# Patient Record
Sex: MALE | Race: OTHER | HISPANIC OR LATINO | Employment: FULL TIME | URBAN - METROPOLITAN AREA
[De-identification: names, ages, dates, MRNs, and addresses within clinical notes are randomized per-mention and may not be internally consistent; named-entity substitution may affect disease eponyms.]

---

## 2021-10-04 ENCOUNTER — OFFICE VISIT (OUTPATIENT)
Dept: ORTHOPEDICS | Facility: CLINIC | Age: 31
End: 2021-10-04
Payer: OTHER MISCELLANEOUS

## 2021-10-04 VITALS — WEIGHT: 275 LBS | BODY MASS INDEX: 35.29 KG/M2 | HEIGHT: 74 IN

## 2021-10-04 DIAGNOSIS — M25.511 ACUTE PAIN OF RIGHT SHOULDER: Primary | ICD-10-CM

## 2021-10-04 PROCEDURE — 99999 PR PBB SHADOW E&M-EST. PATIENT-LVL II: ICD-10-PCS | Mod: PBBFAC,,, | Performed by: ORTHOPAEDIC SURGERY

## 2021-10-04 PROCEDURE — 99203 OFFICE O/P NEW LOW 30 MIN: CPT | Mod: S$GLB,,, | Performed by: ORTHOPAEDIC SURGERY

## 2021-10-04 PROCEDURE — 99203 PR OFFICE/OUTPT VISIT, NEW, LEVL III, 30-44 MIN: ICD-10-PCS | Mod: S$GLB,,, | Performed by: ORTHOPAEDIC SURGERY

## 2021-10-04 PROCEDURE — 99999 PR PBB SHADOW E&M-EST. PATIENT-LVL II: CPT | Mod: PBBFAC,,, | Performed by: ORTHOPAEDIC SURGERY

## 2021-10-25 ENCOUNTER — OFFICE VISIT (OUTPATIENT)
Dept: ORTHOPEDICS | Facility: CLINIC | Age: 31
End: 2021-10-25
Payer: OTHER MISCELLANEOUS

## 2021-10-25 VITALS — HEIGHT: 74 IN | BODY MASS INDEX: 35.29 KG/M2 | WEIGHT: 275 LBS

## 2021-10-25 DIAGNOSIS — M25.511 ACUTE PAIN OF RIGHT SHOULDER: Primary | ICD-10-CM

## 2021-10-25 PROCEDURE — 99999 PR PBB SHADOW E&M-EST. PATIENT-LVL II: ICD-10-PCS | Mod: PBBFAC,,, | Performed by: ORTHOPAEDIC SURGERY

## 2021-10-25 PROCEDURE — 99213 PR OFFICE/OUTPT VISIT, EST, LEVL III, 20-29 MIN: ICD-10-PCS | Mod: S$GLB,,, | Performed by: ORTHOPAEDIC SURGERY

## 2021-10-25 PROCEDURE — 99999 PR PBB SHADOW E&M-EST. PATIENT-LVL II: CPT | Mod: PBBFAC,,, | Performed by: ORTHOPAEDIC SURGERY

## 2021-10-25 PROCEDURE — 99213 OFFICE O/P EST LOW 20 MIN: CPT | Mod: S$GLB,,, | Performed by: ORTHOPAEDIC SURGERY

## 2022-01-20 ENCOUNTER — OFFICE VISIT (OUTPATIENT)
Dept: URGENT CARE | Facility: CLINIC | Age: 32
End: 2022-01-20

## 2022-01-20 VITALS
OXYGEN SATURATION: 98 % | WEIGHT: 290 LBS | HEART RATE: 99 BPM | BODY MASS INDEX: 37.22 KG/M2 | HEIGHT: 74 IN | SYSTOLIC BLOOD PRESSURE: 131 MMHG | RESPIRATION RATE: 18 BRPM | TEMPERATURE: 98 F | DIASTOLIC BLOOD PRESSURE: 84 MMHG

## 2022-01-20 DIAGNOSIS — I10 HYPERTENSION, UNSPECIFIED TYPE: ICD-10-CM

## 2022-01-20 DIAGNOSIS — U07.1 COVID-19 VIRUS DETECTED: ICD-10-CM

## 2022-01-20 DIAGNOSIS — R51.9 ACUTE NONINTRACTABLE HEADACHE, UNSPECIFIED HEADACHE TYPE: ICD-10-CM

## 2022-01-20 DIAGNOSIS — Z20.822 ENCOUNTER FOR LABORATORY TESTING FOR COVID-19 VIRUS: Primary | ICD-10-CM

## 2022-01-20 LAB
CTP QC/QA: YES
SARS-COV-2 RDRP RESP QL NAA+PROBE: POSITIVE

## 2022-01-20 PROCEDURE — U0002 COVID-19 LAB TEST NON-CDC: HCPCS | Mod: QW,S$GLB,, | Performed by: FAMILY MEDICINE

## 2022-01-20 PROCEDURE — 99213 OFFICE O/P EST LOW 20 MIN: CPT | Mod: S$GLB,,, | Performed by: FAMILY MEDICINE

## 2022-01-20 PROCEDURE — U0002: ICD-10-PCS | Mod: QW,S$GLB,, | Performed by: FAMILY MEDICINE

## 2022-01-20 PROCEDURE — 99213 PR OFFICE/OUTPT VISIT, EST, LEVL III, 20-29 MIN: ICD-10-PCS | Mod: S$GLB,,, | Performed by: FAMILY MEDICINE

## 2022-01-20 RX ORDER — IRBESARTAN 150 MG/1
150 TABLET ORAL NIGHTLY
Qty: 90 TABLET | Refills: 3 | Status: SHIPPED | OUTPATIENT
Start: 2022-01-20 | End: 2023-04-15 | Stop reason: SDUPTHER

## 2022-01-20 NOTE — PROGRESS NOTES
"Subjective:       Patient ID: Vijay Nagel is a 31 y.o. male.    Vitals:  height is 6' 2" (1.88 m) and weight is 131.5 kg (290 lb). His temperature is 97.5 °F (36.4 °C). His blood pressure is 131/84 and his pulse is 99. His respiration is 18 and oxygen saturation is 98%.     Chief Complaint: Headache    Patient stated his symptoms started a few months ago and his PCP send in medication for him for his head but his blood pressure is high every day. Pt is not taking BP medication daily       He does not remember the name of the medication his PCP prescribed for him.     Headache   This is a new problem. The current episode started more than 1 month ago. The problem has been unchanged. The pain does not radiate. The pain quality is similar to prior headaches. The pain is at a severity of 0/10. The patient is experiencing no pain. Associated symptoms include coughing and dizziness. Pertinent negatives include no fever. There is no history of cancer or hypertension.       Constitution: Negative for fever.   Respiratory: Positive for cough.    Neurological: Positive for dizziness and headaches.       Objective:      Physical Exam   Constitutional: He is oriented to person, place, and time. He appears well-developed and well-nourished. He is cooperative.  Non-toxic appearance. He does not appear ill. No distress.   HENT:   Head: Normocephalic and atraumatic.   Ears:   Right Ear: Hearing, tympanic membrane, external ear and ear canal normal.   Left Ear: Hearing, tympanic membrane, external ear and ear canal normal.   Nose: Nose normal. No mucosal edema, rhinorrhea or nasal deformity. No epistaxis. Right sinus exhibits no maxillary sinus tenderness and no frontal sinus tenderness. Left sinus exhibits no maxillary sinus tenderness and no frontal sinus tenderness.   Mouth/Throat: Uvula is midline, oropharynx is clear and moist and mucous membranes are normal. Mucous membranes are moist. No trismus in the jaw. Normal " dentition. No uvula swelling. No posterior oropharyngeal erythema. Oropharynx is clear.   Eyes: Conjunctivae and lids are normal. Pupils are equal, round, and reactive to light. Right eye exhibits no discharge. Left eye exhibits no discharge. No scleral icterus.      extraocular movement intact   Neck: Trachea normal and phonation normal. Neck supple.   Cardiovascular: Normal rate, regular rhythm, normal heart sounds, intact distal pulses and normal pulses.   Pulmonary/Chest: Effort normal and breath sounds normal. No respiratory distress.   Abdominal: Normal appearance and bowel sounds are normal. He exhibits no distension, no pulsatile midline mass and no mass. Soft. There is no abdominal tenderness.   Musculoskeletal: Normal range of motion.         General: No deformity or edema. Normal range of motion.   Neurological: He is alert and oriented to person, place, and time. He exhibits normal muscle tone. Coordination normal.   Skin: Skin is warm, dry, intact, not diaphoretic and not pale.   Psychiatric: He has a normal mood and affect. His speech is normal and behavior is normal. Judgment and thought content normal. Cognition and memory  Nursing note and vitals reviewed.        Assessment:       1. Acute nonintractable headache, unspecified headache type    2. Hypertension, unspecified type          Plan:         Acute nonintractable headache, unspecified headache type    Hypertension, unspecified type    Other orders  -     irbesartan (AVAPRO) 150 MG tablet; Take 1 tablet (150 mg total) by mouth every evening.  Dispense: 90 tablet; Refill: 3                Pt advised to take medication daily     Follow up with pcp    Pt advised symptomatic treatment     Quarantine for 5 days    Rest and fluids  Results for orders placed or performed in visit on 01/20/22   POCT COVID-19 Rapid Screening   Result Value Ref Range    POC Rapid COVID Positive (A) Negative     Acceptable Yes

## 2022-01-20 NOTE — LETTER
January 20, 2022      [facility]  [address] [place] [address]  Phone: [phone]  Fax: [phone]       Patient: Vijay Nagel   YOB: 1990  Date of Visit: 01/20/2022    To Whom It May Concern:    Camron Nagel  was at [facility] on 01/20/2022. The patient may return to work/school on 1/24/22   with no restrictions. If you have any questions or concerns, or if I can be of further assistance, please do not hesitate to contact me.    Sincerely,    Oly Baron MD

## 2022-01-20 NOTE — PATIENT INSTRUCTIONS
Please isolate yourself at home.  You may leave home and/or return to work once the following conditions are met:    If you were not hospitalized and are not moderately to severely immunocompromised:    More than 5 days since symptoms first appeared AND   More than 24 hours fever free without medications AND   Symptoms are improving   Continue to wear a mask around others for 5 additional days.    If you were hospitalized OR are moderately to severely immunocompromised:   More than 20 days since symptoms first appeared   More than 24 hours fever free without medications   Symptoms have improved    If you had no symptoms but tested positive:   More than 5 days since the date of the first positive test (20 days if moderately to severely immunocompromised). If you develop symptoms, then use the guidelines above.   Continue to wear a mask around others for 5 additional days.      Contact Tracing    As one of the next steps, you will receive a call or text from the [place] Department of Health ([facility]) COVID-19 Tracing Team. See the contact information below so you know not to ignore the health departments call. It is important that you contact them back immediately so they can help.      Contact Tracer Number:  [phone]  Caller ID for most carriers: [facility]     What is contact tracing?  · Contact tracing is a process that helps identify everyone who has been in close contact with an infected person. Contact tracers let those people know they may have been exposed and guide them on next steps. Confidentiality is important for everyone; no one will be told who may have exposed them to the virus.  · Your involvement is important. The more we know about where and how this virus is spreading, the better chance we have at stopping it from spreading further.  What does exposure mean?  · Exposure means you have been within 6 feet for more than 15 minutes with a person who has or had COVID-19.  What kind of  questions do the contact tracers ask?  · A contact tracer will confirm your basic contact information including name, address, phone number, and next of kin, as well as asking about any symptoms you may have had. Theyll also ask you how you think you may have gotten sick, such as places where you may have been exposed to the virus, and people you were with. Those names will never be shared with anyone outside of that call, and will only be used to help trace and stop the spread of the virus.   I have privacy concerns. How will the state use my information?  · Your privacy about your health is important. All calls are completed using call centers that use the appropriate health privacy protection measures (HIPAA compliance), meaning that your patient information is safe. No one will ever ask you any questions related to immigration status. Your health comes first.   Do I have to participate?  · You do not have to participate, but we strongly encourage you to. Contact tracing can help us catch and control new outbreaks as theyre developing to keep your friends and family safe.   What if I dont hear from anyone?  · If you dont receive a call within 24 hours, you can call the number above right away to inquire about your status. That line is open from 8:00 am - 8:00 p.m., 7 days a week.  Contact tracing saves lives! Together, we have the power to beat this virus and keep our loved ones and neighbors safe.    For more information see CDC link below.      https://www.cdc.gov/coronavirus/2019-ncov/hcp/guidance-prevent-spread.html#precautions        Sources:  [facility], [facility] and [facility] Education       Presión arterial lakeisha, servicio de emergencias   ¿Qué cuidados se necesitan en casa?   · Comuníquese con rivera médico de cabecera para comunicarle que estuvo en el servicio de emergencias. Programe dwayne reyes de seguimiento si así se lo pidieron. Es posible que deba selvin a rivera médico varias veces  antes de que puedan diagnosticar la hipertensión.  · [place] todos heather medicamentos según las indicaciones. No deje de vane ninguno de los medicamentos sin antes hablar con rivera médico.  · Aprenda a controlar rivera presión arterial en casa, si rivera médico le sugiere que lo david.  Call for Emergency Help   · Llame a dwayne ambulancia de inmediato si:   ? Tiene señales de un ataque cardíaco, que pueden incluir:  § Dolor de pecho intenso, presión o malestar con:  § Dificultad para respirar, sudoración, malestar estomacal o piel fría y húmeda.  § Dolor en los brazos, la espalda o la mandíbula.  § Dolor más intenso con actividades stephania subir escaleras.  § Latidos cardíacos rápidos o irregulares.  § Sensación de mareo, desmayos o debilidad.  ? Tiene signos de accidente cerebrovascular, stephania repentinos:  § Entumecimiento o debilidad de la heri, el brazo o la pierna, especialmente en un lado del cuerpo.  § Confusión, dificultad para hablar o comprender.  § Problemas para selvin con yemi o los dos ojos.  § Problemas para caminar, mareos, pérdida del equilibrio o la coordinación.  § Dolor de ovidio intenso sin causa conocida.  ? Tiene convulsiones o se desmaya.  ? Tiene un amy dolor de ovidio con malestar estomacal o vómitos.  ? Tiene dolor de espalda intenso repentino.  ¿Cuándo bernice llamar al médico?   · Dos lecturas de presión arterial mayores de 180/120.  · Rivera orina es marrón o con luly.  · Tiene síntomas nuevos o que empeoran.  Exención de responsabilidad y uso de la información del consumidor   Esta información no constituye un asesoramiento médico específico y no reemplaza la información que usted recibe del proveedor de atención médica. Es solamente un breve resumen de información general. NO incluye toda la información sobre afecciones, enfermedades, lesiones, pruebas, procedimientos, tratamientos, terapias, instrucciones para el lakeisha hospitalaria u opciones de estilo de damir que puedan ser pertinentes para usted. Debe hablar  con el proveedor de atención médica para obtener la información completa sobre las opciones de tratamiento que posee y sobre rivera veronica. No se debe utilizar esta información para decidir si debe o no aceptar los consejos, instrucciones o recomendaciones del proveedor de atención médica. Solamente rivera proveedor de atención médica tiene el conocimiento y la capacitación para aconsejarle sobre lo que es adecuado para usted.

## 2023-04-15 ENCOUNTER — OFFICE VISIT (OUTPATIENT)
Dept: URGENT CARE | Facility: CLINIC | Age: 33
End: 2023-04-15

## 2023-04-15 VITALS
TEMPERATURE: 98 F | HEIGHT: 74 IN | SYSTOLIC BLOOD PRESSURE: 131 MMHG | OXYGEN SATURATION: 95 % | HEART RATE: 68 BPM | DIASTOLIC BLOOD PRESSURE: 78 MMHG | RESPIRATION RATE: 19 BRPM | WEIGHT: 290 LBS | BODY MASS INDEX: 37.22 KG/M2

## 2023-04-15 DIAGNOSIS — I10 HYPERTENSION, UNSPECIFIED TYPE: Primary | ICD-10-CM

## 2023-04-15 DIAGNOSIS — H53.8 BLURRY VISION, LEFT EYE: ICD-10-CM

## 2023-04-15 DIAGNOSIS — Z86.69 HISTORY OF MIGRAINE: ICD-10-CM

## 2023-04-15 DIAGNOSIS — R51.9 NONINTRACTABLE HEADACHE, UNSPECIFIED CHRONICITY PATTERN, UNSPECIFIED HEADACHE TYPE: ICD-10-CM

## 2023-04-15 PROCEDURE — 96372 THER/PROPH/DIAG INJ SC/IM: CPT | Mod: TIER,S$GLB,, | Performed by: FAMILY MEDICINE

## 2023-04-15 PROCEDURE — 99203 OFFICE O/P NEW LOW 30 MIN: CPT | Mod: TIER,25,S$GLB, | Performed by: NURSE PRACTITIONER

## 2023-04-15 PROCEDURE — 96372 PR INJECTION,THERAP/PROPH/DIAG2ST, IM OR SUBCUT: ICD-10-PCS | Mod: TIER,S$GLB,, | Performed by: FAMILY MEDICINE

## 2023-04-15 PROCEDURE — 99203 PR OFFICE/OUTPT VISIT, NEW, LEVL III, 30-44 MIN: ICD-10-PCS | Mod: TIER,25,S$GLB, | Performed by: NURSE PRACTITIONER

## 2023-04-15 RX ORDER — KETOROLAC TROMETHAMINE 30 MG/ML
30 INJECTION, SOLUTION INTRAMUSCULAR; INTRAVENOUS
Status: COMPLETED | OUTPATIENT
Start: 2023-04-15 | End: 2023-04-15

## 2023-04-15 RX ORDER — IRBESARTAN 150 MG/1
150 TABLET ORAL NIGHTLY
Qty: 90 TABLET | Refills: 0 | Status: SHIPPED | OUTPATIENT
Start: 2023-04-15 | End: 2023-07-14

## 2023-04-15 RX ADMIN — KETOROLAC TROMETHAMINE 30 MG: 30 INJECTION, SOLUTION INTRAMUSCULAR; INTRAVENOUS at 09:04

## 2023-04-15 NOTE — PROGRESS NOTES
"Subjective:      Patient ID: Vijay Nagel is a 32 y.o. male.    Vitals:  height is 6' 2" (1.88 m) and weight is 131.5 kg (290 lb). His temperature is 98.1 °F (36.7 °C). His blood pressure is 131/78 and his pulse is 68. His respiration is 19 and oxygen saturation is 95%.     Chief Complaint: Headache    31 y/o male presents to  today with c/o headache, high blood pressure, and change in vision to his left eye. Pt reports he has a h/o high blood pressure and migraines. He was placed on irbesartan, a long time ago, but did not continue taking them. His most recent blood pressure at home was 165/105. He took his wife's irbesartan at home, which helped. His headache has also improved, but is still there; and his vision is still blurry, with pressure around the eye from the headache. Denies chest pain, burry vision, slurred speech, body weakness, and dizziness.     Headache   This is a new problem. The current episode started yesterday. The problem occurs constantly. The problem has been gradually worsening. The pain is located in the Left unilateral region. The pain does not radiate. The pain quality is not similar to prior headaches. The quality of the pain is described as aching, squeezing and throbbing. The pain is at a severity of 8/10. The pain is moderate. Associated symptoms include blurred vision and a visual change. Pertinent negatives include no abdominal pain, coughing, fever or sore throat. Nothing aggravates the symptoms. Treatments tried: Ibersartan. The treatment provided mild relief.     Constitution: Negative for fatigue and fever.   HENT:  Negative for sore throat.    Eyes:  Positive for blurred vision.   Respiratory:  Negative for cough.    Gastrointestinal:  Negative for abdominal pain.   Neurological:  Positive for headaches.    Objective:     Physical Exam   Constitutional: He is oriented to person, place, and time.  Non-toxic appearance. He does not appear ill. No distress.      " Comments:AAOx4. NAD. Appears stable and in no apparent distress. His face is symmetrical. There is no slurred speech. No ataxia. No apparent weakness.      HENT:   Head: Normocephalic and atraumatic.   Nose: Nose normal.   Mouth/Throat: Mucous membranes are moist. Oropharynx is clear.   Eyes: Pupils are equal, round, and reactive to light. Right eye exhibits no discharge. Left eye exhibits no discharge. No scleral icterus. Extraocular movement intact   Neck: Neck supple. No neck rigidity present.   Cardiovascular: Normal rate, regular rhythm and normal heart sounds.   Pulmonary/Chest: Effort normal and breath sounds normal.   Abdominal: Normal appearance.   Musculoskeletal: Normal range of motion.         General: Normal range of motion.   Neurological: He is alert and oriented to person, place, and time.   Skin: Skin is warm, dry and not diaphoretic.   Psychiatric: His behavior is normal. Mood normal.   Nursing note and vitals reviewed.    Assessment:     1. Hypertension, unspecified type    2. Nonintractable headache, unspecified chronicity pattern, unspecified headache type    3. History of migraine    4. Blurry vision, left eye        Plan:       Hypertension, unspecified type  -     irbesartan (AVAPRO) 150 MG tablet; Take 1 tablet (150 mg total) by mouth every evening.  Dispense: 90 tablet; Refill: 0    Nonintractable headache, unspecified chronicity pattern, unspecified headache type  -     ketorolac injection 30 mg    History of migraine  -     ketorolac injection 30 mg    Blurry vision, left eye      Patient Instructions   Please seek ER care with any worsening symptoms, worsening blood pressure, worsening headache, or worsening vision. Or if you develop chest pain, body weakness, slurred speech, or any other serious concerns     Please make appointment with primary care so you can continue to get refills on blood pressure medication.       [facility]  [address]  [place], [place] [address]    Phone: [phone]  Fax: [phone]  Hours of Operation  Monday-Thursday: 8 a.m. to 5 p.m.  Friday: 8 a.m. to 1 p.m.  Saturday-Sunday: Closed

## 2023-04-15 NOTE — PATIENT INSTRUCTIONS
Please seek ER care with any worsening symptoms, worsening blood pressure, worsening headache, or worsening vision. Or if you develop chest pain, body weakness, slurred speech, or any other serious concerns     Please make appointment with primary care so you can continue to get refills on blood pressure medication.       [address], [address]  [place], [place] [address]    Phone: [phone]  Fax: [phone]  Hours of Operation  Monday-Thursday: 8 a.m. to 5 p.m.  Friday: 8 a.m. to 1 p.m.  Saturday-Sunday: Closed